# Patient Record
Sex: MALE | Race: OTHER | NOT HISPANIC OR LATINO
[De-identification: names, ages, dates, MRNs, and addresses within clinical notes are randomized per-mention and may not be internally consistent; named-entity substitution may affect disease eponyms.]

---

## 2019-09-23 ENCOUNTER — APPOINTMENT (OUTPATIENT)
Dept: ORTHOPEDIC SURGERY | Facility: CLINIC | Age: 39
End: 2019-09-23
Payer: COMMERCIAL

## 2019-09-23 DIAGNOSIS — S76.319A STRAIN OF MUSCLE, FASCIA AND TENDON OF THE POSTERIOR MUSCLE GROUP AT THIGH LEVEL, UNSPECIFIED THIGH, INITIAL ENCOUNTER: ICD-10-CM

## 2019-09-23 PROCEDURE — 99213 OFFICE O/P EST LOW 20 MIN: CPT

## 2019-09-23 PROCEDURE — 73521 X-RAY EXAM HIPS BI 2 VIEWS: CPT

## 2019-09-24 PROBLEM — S76.319A HAMSTRING STRAIN: Status: ACTIVE | Noted: 2019-09-24

## 2019-09-24 NOTE — HISTORY OF PRESENT ILLNESS
[de-identified] : Patient reports that the hamstring began to bother him about 3 months ago, atraumatic. Pain varies. Denies trauma. Denies any weakness. Denies any bruising. Denies any lumbar back pain.

## 2019-09-24 NOTE — PHYSICAL EXAM
[de-identified] : On exam the left thigh there is minimal tenderness to the mid proximal part of the hamstring with no palpable step off or mass. There is no bruising. There is no skin abnormality. Bilateral popliteal angles of 10° shy of full extension. There is no pain with hip internal and next on rotation as well as forward flexion there is 5 out of 5 strength as well as knee strength there is normal sensation to light touch throughout. [de-identified] : X-ray left hip. There is no significant bony abnormality of the hip or proximal femur

## 2019-09-24 NOTE — ASSESSMENT
[FreeTextEntry1] : Discussed if no improvement with home exercises consideration to MRI or formal physical therapy

## 2019-10-21 ENCOUNTER — APPOINTMENT (OUTPATIENT)
Dept: ORTHOPEDIC SURGERY | Facility: CLINIC | Age: 39
End: 2019-10-21
Payer: COMMERCIAL

## 2019-10-21 DIAGNOSIS — M77.10 LATERAL EPICONDYLITIS, UNSPECIFIED ELBOW: ICD-10-CM

## 2019-10-21 PROCEDURE — 73070 X-RAY EXAM OF ELBOW: CPT | Mod: LT

## 2019-10-21 PROCEDURE — 99213 OFFICE O/P EST LOW 20 MIN: CPT

## 2019-10-21 NOTE — HISTORY OF PRESENT ILLNESS
[de-identified] : Patient reports that the LEFT lateral elbow has been bothering him for about 3 months now, atraumatic. Sharp stabbing, aching pain. Pain with lifting weights.

## 2019-10-21 NOTE — ASSESSMENT
[FreeTextEntry1] : Discussed at length with patient exam and imaging and treatment modalities. Patient given CounterForce brace and home exercises. If no improvement consideration to injection. Ultimately if no improvement consideration to surgery

## 2019-10-21 NOTE — PHYSICAL EXAM
[de-identified] : Left elbow\par Constitutional: \par The patient is healthy-appearing and in no apparent distress. \par \par Cardiovascular System: \par There is capillary refill less than 2 seconds. \par \par Skin: \par There is no skin abnormalities of elbow.\par \par Left Elbow: \par Appearance: \par There is no deformity, induration, redness, swelling, or warmth and a normal carrying angle. \par \par Bony Palpation: \par There is no tenderness of the medial epicondyle.\par There is no tenderness of olecranon.\par There is no tenderness of the ulnatrochlea articulation.\par There is no tenderness of the coronoid process.\par There is no tenderness of the radial head.\par There is no tenderness of the radiocapitellar joint.\par There is tenderness of the lateral epicondyle. \par \par Soft Tissue Palpation: \par There is no tenderness of the ulnar nerve.\par There is no tenderness of the biceps insertion.\par There is no tenderness of the pronator teres.\par There is no tenderness of the flexor carpi ulnaris.\par There is no tenderness of the flexor carpi radialis.\par There is no tenderness of the annular ligament of the radius.\par There is no tenderness of the brachioradialis.\par There is no tenderness of the radial collateral ligament.\par There is no tenderness of the ulnar collateral ligament.\par There is no tenderness of the antecubital fossa.\par There is tenderness of the extensor carpi radialis brevis.\par There is tenderness of the extensor carpi radialis longus.\par \par Range of Motion:  \par There is full range of motion both actively and passively. \par \par Stability:\par There is no dislocation or laxity to testing.\par  \par Strength: \par There is 5/5 elbow flexion, extension, supination and pronation.  \par \par Neurologic:\par There is normal sensation C5-T1 to light touch. \par \par Psychiatric: \par The patient demonstrates a normal mood and affect and is active and alert.\par  [de-identified] : X-ray left elbow. There is no significant bony abnormality arthritis or fracture

## 2020-02-11 ENCOUNTER — APPOINTMENT (OUTPATIENT)
Dept: ORTHOPEDIC SURGERY | Facility: CLINIC | Age: 40
End: 2020-02-11

## 2020-02-26 ENCOUNTER — APPOINTMENT (OUTPATIENT)
Dept: ORTHOPEDIC SURGERY | Facility: CLINIC | Age: 40
End: 2020-02-26
Payer: COMMERCIAL

## 2020-02-26 DIAGNOSIS — M75.50 BURSITIS OF UNSPECIFIED SHOULDER: ICD-10-CM

## 2020-02-26 PROCEDURE — 99213 OFFICE O/P EST LOW 20 MIN: CPT

## 2020-02-26 NOTE — ASSESSMENT
[FreeTextEntry1] : Patient has previously tried rest, activity modification, exercises, and medications (ie. anti-inflammatories, such as Ibuprofen or Tylenol) without improvement.  Patient has previously had x-ray evaluation.  Given persistent symptoms, a formal request is made for an MRI.\par \par

## 2020-02-26 NOTE — HISTORY OF PRESENT ILLNESS
[de-identified] : Patient presents with right shoulder pain worse over the last 2 months but baseline over the last year or so.  He was seen 2 years ago with a diagnosis of tendinitis bursitis which had improved initially but now he states is causing discomfort  denies any fall or trauma.  States some improvement with home exercises or last few weeks but still persistent discomfort as well as weakness.

## 2020-02-26 NOTE — PHYSICAL EXAM
[de-identified] : Right Shoulder:\par Constitutional:\par The patient is healthy-appearing and in no apparent distress. \par \par Cardiovascular System: \par The capillary refill is less than 2 seconds. \par \par Skin: \par There are no skin abnormalities.\par \par C-Spine/Neck:\par \par Active Range of Motion:\par Flexion				50\par Extension			60\par Lateral rotation			80  \par \par Right Shoulder: \par Inspection: \par There is no atrophy, erythema, warmth, swelling.\par There is no scapular winging.\par There is no AC prominence. \par \par Bony Palpation: \par There is no tenderness of the clavicle.\par There is no tenderness of the acromioclavicular joint.\par There is no tenderness of the greater tuberosity. \par There is no tenderness of the bicipital groove.\par  \par Soft Tissue Palpation: \par There is no tenderness of the trapezius.\par There is no tenderness of the rhomboid.\par There is no tenderness of the subacromial bursa. \par \par Active Range of Motion: \par Forward flexion- 				180 \par Abduction-					150\par External rotation at 0 degrees abduction-	80 \par Internal rotation at 0 degrees abduction-	80\par \par Passive Range of Motion: \par Forward flexion- 			180 \par Abduction-				150\par External rotation at 0 deg abduction-	80 \par Internal rotation at 0 deg abduction-	80\par \par Special Tests: \par Hawkin's  				Positive \par Neer's  				Positive \par Speed's  				Negative\par AC cross-over 			            Negative\par Liberty's  				Negative\par \par Stability: \par There is no general laxity. \par There is no anterior apprehension.\par \par Strength: \par Supraspinatus abduction 		4/5\par External rotation at 0 deg abduction 	4+/5\par Internal rotation at 0 deg abduction	5/5\par Scapular elevation 			5/5 \par \par Neurological System: \par \par There is normal sensation to light touch C5-T1. \par \par Psychiatric: \par The patient demonstrates a normal mood and affect and is active and alert.

## 2021-01-21 ENCOUNTER — APPOINTMENT (OUTPATIENT)
Dept: ORTHOPEDIC SURGERY | Facility: CLINIC | Age: 41
End: 2021-01-21

## 2022-06-16 ENCOUNTER — APPOINTMENT (OUTPATIENT)
Dept: ORTHOPEDIC SURGERY | Facility: CLINIC | Age: 42
End: 2022-06-16
Payer: COMMERCIAL

## 2022-06-16 DIAGNOSIS — M25.561 PAIN IN RIGHT KNEE: ICD-10-CM

## 2022-06-16 DIAGNOSIS — S83.412A SPRAIN OF MEDIAL COLLATERAL LIGAMENT OF LEFT KNEE, INITIAL ENCOUNTER: ICD-10-CM

## 2022-06-16 DIAGNOSIS — M25.562 PAIN IN RIGHT KNEE: ICD-10-CM

## 2022-06-16 PROCEDURE — 73562 X-RAY EXAM OF KNEE 3: CPT | Mod: 50

## 2022-06-16 PROCEDURE — 99213 OFFICE O/P EST LOW 20 MIN: CPT

## 2022-06-16 NOTE — PHYSICAL EXAM
[de-identified] : Bilateral knee\par \par Constitutional: \par The patient is healthy-appearing and in no apparent distress. \par \par Gait:\par The patient ambulates with a normal gait and no limp.\par \par Cardiovascular System: \par The capillary refill is less than 2 seconds. \par \par Skin: \par There are no skin abnormalities.\par \par Bilateral Knee: \par \par Bony Palpation: \par There is no tenderness of the medial joint line. \par There is no tenderness of the lateral joint line.\par There is no tenderness of the medial femoral chondyle.\par There is no tenderness of the lateral femoral chondyle.\par There is no tenderness of the tibial tubercle.\par There is no tenderness of the superior patella.\par There is no tenderness of the inferior patella.\par There is tenderness of the medial patellar facet.\par There is tenderness of the lateral patellar facet.\par \par Soft Tissue Palpation: \par There is no tenderness of the medial retinaculum.\par There is mild tenderness of the LEFT proximal MCL.\par There is no tenderness of the lateral retinaculum.\par There is no tenderness of the quadriceps tendon.\par There is no tenderness of the patella tendon.\par There is no tenderness of the ITB.\par There is no tenderness of the pes anserine.\par \par Active Range of Motion: \par The range of motion at the knee actively and passively is full. \par \par Special Tests: \par There is a negative Apley.\par There is a negative Steinmanns. \par There is a negative Lachman and Anterior Drawer.\par There is a negative Posterior Drawer.  \par There is no varus or valgus laxity.\par \par Strength: \par There is 5/5 hip flexion and 5/5 knee flexion and extension.  \par \par Psychiatric: \par The patient demonstrates a normal mood and affect and is active and alert. [de-identified] : Given patient's reported history and physical examination, x-ray evaluation ( as listed below ) was ordered and performed to aid in diagnosis and treatment of the patient.\par X-ray bilateral knee.  There is no significant bony / soft tissue abnormality, arthritis, or fracture.\par

## 2022-06-16 NOTE — ASSESSMENT
[FreeTextEntry1] : Discussed at length with patient exam history and imaging consistent with knee anterior contusion chondromalacia as well as a grade 1 left proximal MCL strain.  Recommendations for observation and home exercises.  Offered formal physical therapy but patient declined.  Patient to be out of work for one week given the nature of his job requiring potential high-end activity

## 2022-06-16 NOTE — HISTORY OF PRESENT ILLNESS
[de-identified] : Location: Bilateral knees (L > R)\par Duration: 6/11/22\par Context: fell directly on both knees ( while jumping over a concrete barrier )\par Quality: sharp, achy, throbbing\par Aggravating factors: stairs, sitting, standing up\par Associated symptoms: swelling\par Conservative treatment: ice\par Prior studies: N/A

## 2022-06-28 ENCOUNTER — APPOINTMENT (OUTPATIENT)
Dept: ORTHOPEDIC SURGERY | Facility: CLINIC | Age: 42
End: 2022-06-28

## 2022-06-28 DIAGNOSIS — M94.269 CHONDROMALACIA, UNSPECIFIED KNEE: ICD-10-CM

## 2022-06-28 DIAGNOSIS — S80.00XA CONTUSION OF UNSPECIFIED KNEE, INITIAL ENCOUNTER: ICD-10-CM

## 2022-06-28 PROCEDURE — 99213 OFFICE O/P EST LOW 20 MIN: CPT

## 2022-06-29 PROBLEM — M94.269 CHONDROMALACIA, KNEE: Status: ACTIVE | Noted: 2022-06-16

## 2022-06-29 PROBLEM — S80.00XA KNEE CONTUSION: Status: ACTIVE | Noted: 2022-06-16

## 2022-06-29 NOTE — ASSESSMENT
[FreeTextEntry1] : Re-dscussed at length with patient exam history and imaging consistent with knee anterior contusion chondromalacia as well as a grade 1 left proximal MCL strain.  Recommendations for observation and home exercises.  Re-ofered formal physical therapy but patient declined.  Discussed expectation for recovery in 6-8 weeks

## 2022-06-29 NOTE — PHYSICAL EXAM
[de-identified] : Bilateral knee\par \par Constitutional: \par The patient is healthy-appearing and in no apparent distress. \par \par Gait:\par The patient ambulates with a normal gait and no limp.\par \par Cardiovascular System: \par The capillary refill is less than 2 seconds. \par \par Skin: \par There are no skin abnormalities.\par \par Bilateral Knee: \par \par Bony Palpation: \par There is no tenderness of the medial joint line. \par There is no tenderness of the lateral joint line.\par There is no tenderness of the medial femoral chondyle.\par There is no tenderness of the lateral femoral chondyle.\par There is no tenderness of the tibial tubercle.\par There is no tenderness of the superior patella.\par There is no tenderness of the inferior patella.\par There is tenderness of the medial patellar facet.\par There is tenderness of the lateral patellar facet.\par \par Soft Tissue Palpation: \par There is no tenderness of the medial retinaculum.\par There is mild tenderness of the LEFT proximal MCL.\par There is no tenderness of the lateral retinaculum.\par There is no tenderness of the quadriceps tendon.\par There is no tenderness of the patella tendon.\par There is no tenderness of the ITB.\par There is no tenderness of the pes anserine.\par \par Active Range of Motion: \par The range of motion at the knee actively and passively is full. \par \par Strength: \par There is 5/5 hip flexion and 5/5 knee flexion and extension.  \par \par Psychiatric: \par The patient demonstrates a normal mood and affect and is active and alert.

## 2022-06-29 NOTE — HISTORY OF PRESENT ILLNESS
[de-identified] : Patient is an established patient seen one week ago for bilateral knee pain s/p fall with a diagnosis of knee contusion and PFPS.  He states he has been improving but still feels some stiffness.  He reports only starting the recommended home exercises yesterday

## 2022-09-07 ENCOUNTER — EMERGENCY (EMERGENCY)
Facility: HOSPITAL | Age: 42
LOS: 1 days | Discharge: HOME | End: 2022-09-07
Attending: EMERGENCY MEDICINE | Admitting: EMERGENCY MEDICINE

## 2022-09-07 VITALS
OXYGEN SATURATION: 97 % | TEMPERATURE: 98 F | SYSTOLIC BLOOD PRESSURE: 145 MMHG | RESPIRATION RATE: 18 BRPM | DIASTOLIC BLOOD PRESSURE: 87 MMHG | HEART RATE: 47 BPM | WEIGHT: 169.98 LBS

## 2022-09-07 DIAGNOSIS — R51.9 HEADACHE, UNSPECIFIED: ICD-10-CM

## 2022-09-07 DIAGNOSIS — S63.502A UNSPECIFIED SPRAIN OF LEFT WRIST, INITIAL ENCOUNTER: ICD-10-CM
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
## 2022-09-07 DIAGNOSIS — S02.2XXA FRACTURE OF NASAL BONES, INITIAL ENCOUNTER FOR CLOSED FRACTURE: ICD-10-CM

## 2022-09-07 PROCEDURE — 99053 MED SERV 10PM-8AM 24 HR FAC: CPT

## 2022-09-07 PROCEDURE — 73110 X-RAY EXAM OF WRIST: CPT | Mod: 26,LT

## 2022-09-07 PROCEDURE — 70486 CT MAXILLOFACIAL W/O DYE: CPT | Mod: 26

## 2022-09-07 PROCEDURE — 99284 EMERGENCY DEPT VISIT MOD MDM: CPT | Mod: 25

## 2022-09-07 RX ORDER — IBUPROFEN 200 MG
600 TABLET ORAL ONCE
Refills: 0 | Status: COMPLETED | OUTPATIENT
Start: 2022-09-07 | End: 2022-09-07

## 2022-09-07 RX ADMIN — Medication 600 MILLIGRAM(S): at 22:49

## 2022-09-07 NOTE — ED PROVIDER NOTE - PHYSICAL EXAMINATION
GENERAL: well appearing, no acute distress   HEAD: atraumatic   EYES: EOMI   ENT: moist oral mucosa; swelling and ttp to nose; no nasal septal hematoma   CARDIAC: regular rate  RESPIRATORY: no increased work of breathing   MUSCULOSKELETAL: no deformity; L wrist with mild distal ulna ttp w/ full ROM; n/v intact; compartments soft   NEUROLOGICAL: AAOx3, CN's II-XII intact, strength 5/5 bilateral UE and LE, sensation intact to light touch, finger to nose intact, steady gait  SKIN: no visible rash  PSYCHIATRIC: cooperative

## 2022-09-07 NOTE — ED PROVIDER NOTE - CARE PROVIDER_API CALL
Yehuda Li)  Otolaryngology  [address] [facility], [address]nd [place] [address]  Phone: [phone]  Fax: [phone]  Follow Up Time:     Ady Maldonado)  Plastic Surgery  [address], [address]  [place] [address]  Phone: [phone]  Fax: [phone]  Follow Up Time:
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
## 2022-09-07 NOTE — ED PROVIDER NOTE - CARE PROVIDERS DIRECT ADDRESSES
,[email or web address] unknown

## 2022-09-07 NOTE — ED PROVIDER NOTE - PATIENT PORTAL LINK FT
You can access the FollowMyHealth Patient Portal offered by [facility] by registering at the following website: [email or web address]. By joining [facility]’s FollowMyHealth portal, you will also be able to view your health information using other applications (apps) compatible with our system.
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
## 2022-09-08 VITALS
DIASTOLIC BLOOD PRESSURE: 84 MMHG | TEMPERATURE: 98 F | SYSTOLIC BLOOD PRESSURE: 137 MMHG | RESPIRATION RATE: 18 BRPM | OXYGEN SATURATION: 97 % | HEART RATE: 49 BPM

## 2023-08-09 ENCOUNTER — APPOINTMENT (OUTPATIENT)
Dept: ORTHOPEDIC SURGERY | Facility: CLINIC | Age: 43
End: 2023-08-09
Payer: COMMERCIAL

## 2023-08-09 VITALS — WEIGHT: 175 LBS | BODY MASS INDEX: 25.05 KG/M2 | HEIGHT: 70 IN

## 2023-08-09 DIAGNOSIS — M89.8X1 OTHER SPECIFIED DISORDERS OF BONE, SHOULDER: ICD-10-CM

## 2023-08-09 PROCEDURE — 73030 X-RAY EXAM OF SHOULDER: CPT | Mod: RT

## 2023-08-09 PROCEDURE — 72040 X-RAY EXAM NECK SPINE 2-3 VW: CPT

## 2023-08-09 PROCEDURE — 99213 OFFICE O/P EST LOW 20 MIN: CPT

## 2023-08-09 RX ORDER — METAXALONE 800 MG/1
800 TABLET ORAL 3 TIMES DAILY
Qty: 30 | Refills: 1 | Status: ACTIVE | COMMUNITY
Start: 2023-08-09 | End: 1900-01-01

## 2023-08-09 RX ORDER — NABUMETONE 500 MG/1
500 TABLET, FILM COATED ORAL
Qty: 60 | Refills: 2 | Status: ACTIVE | COMMUNITY
Start: 2023-08-09 | End: 1900-01-01

## 2023-08-09 NOTE — ASSESSMENT
[FreeTextEntry1] : Discussed at length with patient regarding symptoms and diagnosis.  Treatment options discussed and patient's questions answered and patient expresses understanding.  If persistent symptoms consideration to MRI of cervical spine.  Patient offered physical therapy but declined and elects home exercises/observation only. Patient to follow-up in office if persistent or recurrent symptoms. Patient instructed to call if any questions or concerns.

## 2023-08-09 NOTE — HISTORY OF PRESENT ILLNESS
[de-identified] : Established Patient with NEW Pain - Initial Visit for RIGHT Shoulder / Cervical   Reason: Unknown- Denies Injury  Duration: 6+ Months Ago Medical HX:   Prior Studies: N/A Aggravating FX: At Rest  Symptoms: "like when you hit your funny - bone" / Radiating to Cervical - Scapular / Numbness Radiating down Extremity Alleviating FX: N/A Pain MED: N/A NKDA

## 2023-08-09 NOTE — PHYSICAL EXAM
[de-identified] : RIGHT Shoulder: Constitutional: The patient is healthy-appearing and in no apparent distress.   Cardiovascular System:  The capillary refill is less than 2 seconds.   Skin:  There are no skin abnormalities.  C-Spine/Neck:  Active Range of Motion: Flexion				50 Extension			60 Lateral rotation			80    RIGHT Shoulder:  Inspection:  There is no atrophy, erythema, warmth, swelling. There is no scapular winging. There is no AC prominence.   Bony Palpation:  There is no tenderness of the clavicle. There is no tenderness of the acromioclavicular joint. There is no tenderness of the greater tuberosity.  There is no tenderness of the bicipital groove.   Soft Tissue Palpation:  There is tenderness of the trapezius. There is tenderness of the rhomboid. There is no tenderness of the subacromial bursa.   Active Range of Motion:  Forward flexion- 				180  Abduction-					150 External rotation at 0 degrees abduction-	80  Internal rotation at 0 degrees abduction-	80  Passive Range of Motion:  Forward flexion- 			180  Abduction-				150 External rotation at 0 deg abduction-	80  Internal rotation at 0 deg abduction-	80  Special Tests:  Hawkin's  				Negative  Neer's  				Negative Speed's  				Negative AC cross-over 			            Negative Hyde's  				Negative  Stability:  There is no general laxity.   Psychiatric:  The patient demonstrates a normal mood and affect and is active and alert  [de-identified] : X-ray RIGHT shoulder:  There is moderate GH arthritis with inferior humeral spur X-ray Cervical Spine:  There is loss of lordosis

## 2023-08-14 ENCOUNTER — APPOINTMENT (OUTPATIENT)
Dept: ORTHOPEDIC SURGERY | Facility: CLINIC | Age: 43
End: 2023-08-14
Payer: COMMERCIAL

## 2023-08-14 DIAGNOSIS — M76.892 OTHER SPECIFIED ENTHESOPATHIES OF LEFT LOWER LIMB, EXCLUDING FOOT: ICD-10-CM

## 2023-08-14 PROCEDURE — 99213 OFFICE O/P EST LOW 20 MIN: CPT

## 2023-08-14 PROCEDURE — 73502 X-RAY EXAM HIP UNI 2-3 VIEWS: CPT

## 2023-08-14 NOTE — ASSESSMENT
[FreeTextEntry1] : Discussed at length with patient regarding symptoms and diagnosis.  Treatment options discussed and patient's questions answered and patient expresses understanding.   Patient offered physical therapy but declined and elects home exercises/observation only.  Patient to follow-up in office if persistent or recurrent symptoms.  Patient instructed to call if any questions or concerns.

## 2023-08-14 NOTE — PHYSICAL EXAM
[de-identified] : Left hip  Constitutional:  The patient is healthy-appearing and in no apparent distress.   Gait: The patient ambulates with a normal gait and no limp.  Cardiovascular System:  There is capillary refill less than 2 seconds.   Skin:  There is no skin abnormalities.  Lumbar Spine; There is no significance malalignment and no tenderness to the paraspinal musculature.  Left hip:  Bony Palpation:  There is no tenderness of the iliac crest. There is no tenderness of the ASIS. There is no tenderness of the PSIS. There is no tenderness of the SI joint. There is no tenderness of the greater trochanter.   Soft Tissue Palpation:  There is no tenderness of the hip adductors. There is mild tenderness of the hip abductors just distal to the anterior iliac creast. There is no tenderness of the hamstrings.  There is no tenderness of the piriformis.  There is no tenderness of the hip flexors.  Active Range of Motion:  There is full range of motion at the hip actively and passively.   Special Tests:  There is a positive Pati's test. There is a negative Kenn-Yina test.   Strength:  There is 5/5 hip flexion, adduction, abduction.    Psychiatric:  The patient demonstrates a normal mood and affect and is active and alert.   [de-identified] : x-ray left hip:  There is no arthritis, bony abnormality

## 2023-08-14 NOTE — HISTORY OF PRESENT ILLNESS
[de-identified] : Established patient with NEW Pain at LEFT Hip ( points to LEFT iliac crest at abductors) Duration 1 Month ago  Denies groin pain

## 2024-01-09 ENCOUNTER — APPOINTMENT (OUTPATIENT)
Dept: ORTHOPEDIC SURGERY | Facility: CLINIC | Age: 44
End: 2024-01-09
Payer: COMMERCIAL

## 2024-01-09 DIAGNOSIS — M75.51 BURSITIS OF RIGHT SHOULDER: ICD-10-CM

## 2024-01-09 DIAGNOSIS — M19.011 PRIMARY OSTEOARTHRITIS, RIGHT SHOULDER: ICD-10-CM

## 2024-01-09 PROCEDURE — 73030 X-RAY EXAM OF SHOULDER: CPT | Mod: RT

## 2024-01-09 PROCEDURE — 99213 OFFICE O/P EST LOW 20 MIN: CPT

## 2024-01-09 PROCEDURE — 72040 X-RAY EXAM NECK SPINE 2-3 VW: CPT

## 2024-01-09 NOTE — HISTORY OF PRESENT ILLNESS
[de-identified] : Patient is an established patient presenting for follow-up evaluation of his right shoulder discomfort he states he had improved but now recurrence over the last several weeks he is not doing any current home exercises

## 2024-01-09 NOTE — ASSESSMENT
[FreeTextEntry1] : Discussed at length with patient exam history and imaging at this time patient elects home exercises only and if no improvement recommendation would be for MRI and cortisone injection as well

## 2024-01-09 NOTE — PHYSICAL EXAM
[de-identified] : Right Shoulder: Constitutional: The patient is healthy-appearing and in no apparent distress.   Cardiovascular System:  The capillary refill is less than 2 seconds.   Skin:  There are no skin abnormalities.  C-Spine/Neck:  Active Range of Motion: Flexion				50 Extension			60 Lateral rotation			80    Right Shoulder:  Inspection:  There is no atrophy, erythema, warmth, swelling. There is no scapular winging. There is no AC prominence.   Bony Palpation:  There is no tenderness of the clavicle. There is no tenderness of the acromioclavicular joint. There is no tenderness of the greater tuberosity.  There is no tenderness of the bicipital groove.   Soft Tissue Palpation:  There is no tenderness of the trapezius. There is no tenderness of the rhomboid. There is no tenderness of the subacromial bursa.   Active Range of Motion:  Forward flexion- 				180  Abduction-					150 External rotation at 0 degrees abduction-	80  Internal rotation at 0 degrees abduction-	80  Passive Range of Motion:  Forward flexion- 			180  Abduction-				150 External rotation at 0 deg abduction-	80  Internal rotation at 0 deg abduction-	80  Special Tests:  Hawkin's  				Positive  Neer's  				Positive  Speed's  				Negative AC cross-over 			            Negative LaGrange's  				Negative  Stability:  There is no general laxity.  There is no anterior apprehension.  Strength:  Supraspinatus abduction 		5/5 External rotation at 0 deg abduction 	5/5 Internal rotation at 0 deg abduction	5/5 Scapular elevation 			5/5   Neurological System:   There is normal sensation to light touch C5-T1.   Stability:  There is no general laxity.   Psychiatric:  The patient demonstrates a normal mood and affect and is active and alert.

## 2024-11-18 ENCOUNTER — APPOINTMENT (OUTPATIENT)
Dept: ORTHOPEDIC SURGERY | Facility: CLINIC | Age: 44
End: 2024-11-18

## 2024-11-19 ENCOUNTER — APPOINTMENT (OUTPATIENT)
Dept: ORTHOPEDIC SURGERY | Facility: CLINIC | Age: 44
End: 2024-11-19
Payer: COMMERCIAL

## 2024-11-19 DIAGNOSIS — S76.319A STRAIN OF MUSCLE, FASCIA AND TENDON OF THE POSTERIOR MUSCLE GROUP AT THIGH LEVEL, UNSPECIFIED THIGH, INITIAL ENCOUNTER: ICD-10-CM

## 2024-11-19 PROCEDURE — 99213 OFFICE O/P EST LOW 20 MIN: CPT

## 2024-12-16 ENCOUNTER — APPOINTMENT (OUTPATIENT)
Dept: ORTHOPEDIC SURGERY | Facility: CLINIC | Age: 44
End: 2024-12-16
Payer: COMMERCIAL

## 2024-12-16 DIAGNOSIS — S76.319A STRAIN OF MUSCLE, FASCIA AND TENDON OF THE POSTERIOR MUSCLE GROUP AT THIGH LEVEL, UNSPECIFIED THIGH, INITIAL ENCOUNTER: ICD-10-CM

## 2024-12-16 PROCEDURE — 99213 OFFICE O/P EST LOW 20 MIN: CPT

## 2025-02-27 ENCOUNTER — APPOINTMENT (OUTPATIENT)
Dept: ORTHOPEDIC SURGERY | Facility: CLINIC | Age: 45
End: 2025-02-27
Payer: COMMERCIAL

## 2025-02-27 DIAGNOSIS — M77.02 MEDIAL EPICONDYLITIS, LEFT ELBOW: ICD-10-CM

## 2025-02-27 PROCEDURE — 99213 OFFICE O/P EST LOW 20 MIN: CPT

## 2025-04-29 NOTE — ED ADULT TRIAGE NOTE - PATIENT ON (OXYGEN DELIVERY METHOD)
What Type Of Note Output Would You Prefer (Optional)?: Standard Output Hpi Title: Evaluation of Skin Lesions How Severe Are Your Spot(S)?: mild Have Your Spot(S) Been Treated In The Past?: has not been treated Additional History: Pt has some spots on his back and RT thigh he wants looked at. room air